# Patient Record
Sex: MALE | Race: BLACK OR AFRICAN AMERICAN | NOT HISPANIC OR LATINO | Employment: UNEMPLOYED | URBAN - METROPOLITAN AREA
[De-identification: names, ages, dates, MRNs, and addresses within clinical notes are randomized per-mention and may not be internally consistent; named-entity substitution may affect disease eponyms.]

---

## 2017-05-14 ENCOUNTER — HOSPITAL ENCOUNTER (EMERGENCY)
Facility: HOSPITAL | Age: 52
Discharge: HOME | End: 2017-05-14
Attending: EMERGENCY MEDICINE

## 2017-05-14 VITALS
OXYGEN SATURATION: 98 % | RESPIRATION RATE: 15 BRPM | BODY MASS INDEX: 33.13 KG/M2 | TEMPERATURE: 99 F | HEART RATE: 95 BPM | HEIGHT: 73 IN | SYSTOLIC BLOOD PRESSURE: 149 MMHG | DIASTOLIC BLOOD PRESSURE: 85 MMHG | WEIGHT: 250 LBS

## 2017-05-14 DIAGNOSIS — J06.9 URI (UPPER RESPIRATORY INFECTION): ICD-10-CM

## 2017-05-14 PROCEDURE — 99283 EMERGENCY DEPT VISIT LOW MDM: CPT

## 2017-05-14 NOTE — DISCHARGE INSTRUCTIONS
Viral Upper Respiratory Illness (Adult)  You have a viral upper respiratory illness (URI), which is another term for the common cold. This illness is contagious during the first few days. It is spread through the air by coughing and sneezing. It may also be spread by direct contact (touching the sick person and then touching your own eyes, nose, or mouth). Frequent handwashing will decrease risk of spread. Most viral illnesses go away within 7 to 10 days with rest and simple home remedies. Sometimes the illness may last for several weeks. Antibiotics will not kill a virus, and they are generally not prescribed for this condition.    Home care  · If symptoms are severe, rest at home for the first 2 to 3 days. When you resume activity, don't let yourself get too tired.  · Avoid being exposed to cigarette smoke (yours or others).  · You may use acetaminophen or ibuprofen to control pain and fever, unless another medicine was prescribed. (Note: If you have chronic liver or kidney disease, have ever had a stomach ulcer or gastrointestinal bleeding, or are taking blood-thinning medicines, talk with your healthcare provider before using these medicines.) Aspirin should never be given to anyone under 18 years of age who is ill with a viral infection or fever. It may cause severe liver or brain damage.  · Your appetite may be poor, so a light diet is fine. Avoid dehydration by drinking 6 to 8 glasses of fluids per day (water, soft drinks, juices, tea, or soup). Extra fluids will help loosen secretions in the nose and lungs.  · Over-the-counter cold medicines will not shorten the length of time youre sick, but they may be helpful for the following symptoms: cough, sore throat, and nasal and sinus congestion. (Note: Do not use decongestants if you have high blood pressure.)  Follow-up care  Follow up with your healthcare provider, or as advised.  When to seek medical advice  Call your healthcare provider right away if any  of these occur:  · Cough with lots of colored sputum (mucus)  · Severe headache; face, neck, or ear pain  · Difficulty swallowing due to throat pain  · Fever of 100.4°F (38°C)  Call 911, or get immediate medical care  Call emergency services right away if any of these occur:  · Chest pain, shortness of breath, wheezing, or difficulty breathing  · Coughing up blood  · Inability to swallow due to throat pain  Date Last Reviewed: 9/13/2015  © 8235-5238 [employer]. [address] [place] [address]. All rights reserved. This information is not intended as a substitute for professional medical care. Always follow your healthcare professional's instructions.

## 2017-05-14 NOTE — ED PROVIDER NOTES
SCRIBE #1 NOTE: I, Corinne Mack, am scribing for, and in the presence of, Ramiro Vega MD. I have scribed the entire note.      History      Chief Complaint   Patient presents with    Sore Throat     X 3 days       Review of patient's allergies indicates:  No Known Allergies     HPI   HPI    5/14/2017, 6:40 AM   History obtained from the patient      History of Present Illness: Demetrius Nogueira is a 51 y.o. male patient who presents to the Emergency Department for  A sore throat which onset gradually 3 days ago. Symptoms are constant and moderate in severity. Pt states that he has chest discomfort and sore throat when coughing. No mitigating or exacerbating factors reported. No associated sxs. Patient denies any fever, chills, CP, SOB, cough, N/V/D, abd pain, back pain, HA, dizziness, rhinorrhea, congestion, and all other sxs at this time. Pt has hx of oral yeast infection 1 month ago. No further complaints or concerns at this time.         Arrival mode: Personal vehicle      PCP: Primary Doctor No       Past Medical History:  No past medical history on file.    Past Surgical History:  Past Surgical History:   Procedure Laterality Date    CYST REMOVAL      cyst removal from right thumb         Family History:  Family History   Problem Relation Age of Onset    Cancer Paternal Grandmother      breast cancer    Cancer Maternal Grandfather        Social History:  Social History     Social History Main Topics    Smoking status: Never Smoker    Smokeless tobacco: Never Used    Alcohol use No    Drug use: No    Sexual activity: Not given       ROS   Review of Systems   Constitutional: Negative for chills, fatigue and fever.   HENT: Positive for sore throat (with cough). Negative for congestion, drooling, facial swelling, rhinorrhea and trouble swallowing.    Respiratory: Positive for cough. Negative for shortness of breath.    Cardiovascular: Positive for chest pain (with cough). Negative for leg  swelling.   Gastrointestinal: Negative for abdominal pain, diarrhea, nausea and vomiting.   Genitourinary: Negative.    Musculoskeletal: Negative for back pain and neck pain.   Skin: Negative for rash and wound.   Neurological: Negative for dizziness, numbness and headaches.   All other systems reviewed and are negative.      Physical Exam    Initial Vitals   BP Pulse Resp Temp SpO2   05/14/17 0635 05/14/17 0635 05/14/17 0635 05/14/17 0635 05/14/17 0635   149/85 95 15 99 °F (37.2 °C) 98 %      Physical Exam  Nursing Notes and Vital Signs Reviewed.  Constitutional: Patient is in no apparent distress. Awake and alert. Well-developed and well-nourished.  Head: Atraumatic. Normocephalic.  Eyes: PERRL. EOM intact. Conjunctivae are not pale. No scleral icterus.  ENT: Mucous membranes are moist. Oropharynx is clear and symmetric. Tongue is normal size. No tonsillar erythema or exudates. Uvula is normal size with no edema. No drooling. No tenderness or swelling to parotid glands. Oral floor is soft without protrusion. No peritonsillar abscess. No airway obstruction. Pt is handling secretions.  Neck: Supple. Full ROM. No lymphadenopathy.  Cardiovascular: Regular rate. Regular rhythm. No murmurs, rubs, or gallops. Distal pulses are 2+ and symmetric.  Pulmonary/Chest: No respiratory distress. Clear to auscultation bilaterally. No wheezing, rales, or rhonchi.  Abdominal: Soft and non-distended.  There is no tenderness.  No rebound, guarding, or rigidity.   Musculoskeletal: Moves all extremities. No obvious deformities. No edema. No calf tenderness.  Skin: Warm and dry.  Neurological:  Alert, awake, and appropriate.  Normal speech.  No acute focal neurological deficits are appreciated.  Psychiatric: Normal affect. Good eye contact. Appropriate in content.    ED Course    Procedures  ED Vital Signs:  Vitals:    05/14/17 0635   BP: (!) 149/85   Pulse: 95   Resp: 15   Temp: 99 °F (37.2 °C)   TempSrc: Oral   SpO2: 98%   Weight:  "113.4 kg (250 lb)   Height: 6' 1" (1.854 m)       Abnormal Lab Results:  Labs Reviewed - No data to display     All Lab Results:  None    Imaging Results:  Imaging Results         X-Ray Chest PA And Lateral (Final result) Result time:  05/14/17 08:05:47    Final result by Osvaldo Norris MD (05/14/17 08:05:47)    Impression:         No acute findings.      Electronically signed by: OSVALDO NORRIS MD  Date:     05/14/17  Time:    08:05     Narrative:    Exam: XR CHEST PA AND LATERAL,    Date:  05/14/17 07:53:16    History: Acute upper respiratory infection, and specified    Comparison:  No prior  studies available for comparison.    Findings:     Lungs clear.  Heart is within normal limits.  Mildly tortuous aorta.  No significant bony abnormality.                      The Emergency Provider reviewed the vital signs and test results, which are outlined above.    ED Discussion     8:13 AM: Reassessed pt at this time. AA and in no distress. Pt is handling secretions. Follow-up with PCP. Discussed with pt all pertinent ED information and results. Discussed pt dx and plan of tx. Gave pt all f/u and return to the ED instructions. All questions and concerns were addressed at this time. Pt expresses understanding of information and instructions, and is comfortable with plan to discharge. Pt is stable for discharge.    Patient presents with upper respiratory and flulike symptoms. Based on my assessment in the ED, I do not suspect any respiratory, airway, pulmonary, cardiovascular (including myocarditis), metabolic, CNS, medical, or surgical emergency medical condition. I have discussed with the patient and/or caregiver signs and symptoms for secondary bacterial infections, such as pneumonia. I believe that the patient's symptoms are most consistent with a viral illness, possibly influenza. Patient is safe for discharge home with conservative therapy.      ED Medication(s):  Medications - No data to display    New " Prescriptions    No medications on file       Follow-up Information     Follow up with Primary Doctor No.        Follow up with [facility] In 1 day.    Specialty:  Emergency Medicine    Why:  Patient should follow up with PCP in 1-2 days or return to the ED for any concerns or worsening of condition.    Contact information:    [address] [facility] [address] [phone]            Medical Decision Making    Medical Decision Making:   Clinical Tests:   Radiological Study: Ordered and Reviewed           Scribe Attestation:   Scribe #1: I performed the above scribed service and the documentation accurately describes the services I performed. I attest to the accuracy of the note.    Attending:   Physician Attestation Statement for Scribe #1: I, Ramiro Vega MD, personally performed the services described in this documentation, as scribed by Corinne Mack, in my presence, and it is both accurate and complete.          Clinical Impression       ICD-10-CM ICD-9-CM   1. URI (upper respiratory infection) J06.9 465.9       Disposition:   Disposition: Discharged  Condition: Stable         Ramiro Vega MD  05/14/17 0820

## 2017-05-14 NOTE — ED AVS SNAPSHOT
[facility]  [id number] [place] [id number]               Demetrius Nogueira   2017  7:18 AM   ED    Description:  Male : 1965   Department:  [facility]           Your Care was Coordinated By:     Provider Role From To    Ramiro Vega MD Attending Provider 17 0718 17 0818      Reason for Visit     Sore Throat           Diagnoses this Visit        Comments    URI (upper respiratory infection)           ED Disposition     ED Disposition Condition Comment    Discharge             To Do List           Follow-up Information     Follow up with Primary Doctor No.        Follow up with [facility] In 1 day.    Specialty:  Emergency Medicine    Why:  Patient should follow up with PCP in 1-2 days or return to the ED for any concerns or worsening of condition.    Contact information:    [address] [place] [id number] [phone]      [facility]     [facility] Care Line -  Assistance  Unless otherwise directed by your provider, please contact [facility], our nurse care line that is available for  assistance.     Registered nurses in the [facility] provide: appointment scheduling, clinical advisement, health education, and other advisory services.  Call: [phone] (toll free)               Medications           Message regarding Medications     Verify the changes and/or additions to your medication regime listed below are the same as discussed with your clinician today.  If any of these changes or additions are incorrect, please notify your healthcare provider.             Verify that the below list of medications is an accurate representation of the medications you are currently taking.  If none reported, the list may be blank. If incorrect, please contact your healthcare provider. Carry this list with you in case of emergency.          "       Clinical Reference Information           Your Vitals Were     BP Pulse Temp Resp Height Weight    149/85 (BP Location: Right arm, Patient Position: Sitting) 95 99 °F (37.2 °C) (Oral) 15 6' 1" (1.854 m) 113.4 kg (250 lb)    SpO2 BMI             98% 32.98 kg/m2         Allergies as of 5/14/2017     No Known Allergies      Immunizations Administered on Date of Encounter - 5/14/2017     None      ED Micro, Lab, POCT     None      ED Imaging Orders     Start Ordered       Status Ordering Provider    05/14/17 0736 05/14/17 0735  X-Ray Chest PA And Lateral  1 time imaging      Final result         Discharge Instructions         Viral Upper Respiratory Illness (Adult)  You have a viral upper respiratory illness (URI), which is another term for the common cold. This illness is contagious during the first few days. It is spread through the air by coughing and sneezing. It may also be spread by direct contact (touching the sick person and then touching your own eyes, nose, or mouth). Frequent handwashing will decrease risk of spread. Most viral illnesses go away within 7 to 10 days with rest and simple home remedies. Sometimes the illness may last for several weeks. Antibiotics will not kill a virus, and they are generally not prescribed for this condition.    Home care  · If symptoms are severe, rest at home for the first 2 to 3 days. When you resume activity, don't let yourself get too tired.  · Avoid being exposed to cigarette smoke (yours or others).  · You may use acetaminophen or ibuprofen to control pain and fever, unless another medicine was prescribed. (Note: If you have chronic liver or kidney disease, have ever had a stomach ulcer or gastrointestinal bleeding, or are taking blood-thinning medicines, talk with your healthcare provider before using these medicines.) Aspirin should never be given to anyone under 18 years of age who is ill with a viral infection or fever. It may cause severe liver or brain " damage.  · Your appetite may be poor, so a light diet is fine. Avoid dehydration by drinking 6 to 8 glasses of fluids per day (water, soft drinks, juices, tea, or soup). Extra fluids will help loosen secretions in the nose and lungs.  · Over-the-counter cold medicines will not shorten the length of time youre sick, but they may be helpful for the following symptoms: cough, sore throat, and nasal and sinus congestion. (Note: Do not use decongestants if you have high blood pressure.)  Follow-up care  Follow up with your healthcare provider, or as advised.  When to seek medical advice  Call your healthcare provider right away if any of these occur:  · Cough with lots of colored sputum (mucus)  · Severe headache; face, neck, or ear pain  · Difficulty swallowing due to throat pain  · Fever of 100.4°F (38°C)  Call 911, or get immediate medical care  Call emergency services right away if any of these occur:  · Chest pain, shortness of breath, wheezing, or difficulty breathing  · Coughing up blood  · Inability to swallow due to throat pain  Date Last Reviewed: 9/13/2015  © 7348-9337 [employer]. [address] [place] [address]. All rights reserved. This information is not intended as a substitute for professional medical care. Always follow your healthcare professional's instructions.           [facility] complies with applicable Federal civil rights laws and does not discriminate on the basis of race, color, national origin, age, disability, or sex.        Language Assistance Services     ATTENTION: Language assistance services are available, free of charge. Please call [phone].      ATENCIÓN: Si habla español, tiene a cool disposición servicios gratuitos de asistencia lingüística. Llame al [phone].     CHÚ Ý: N?u b?n nói Ti?ng Vi?t, có các d?ch v? h? tr? ngôn ng? mi?n phí dành cho b?n. G?i s? [phone].

## 2018-06-08 ENCOUNTER — OFFICE VISIT (OUTPATIENT)
Dept: FAMILY MEDICINE | Facility: CLINIC | Age: 53
End: 2018-06-08
Payer: COMMERCIAL

## 2018-06-08 VITALS
BODY MASS INDEX: 31.74 KG/M2 | SYSTOLIC BLOOD PRESSURE: 144 MMHG | WEIGHT: 234.38 LBS | OXYGEN SATURATION: 98 % | HEIGHT: 72 IN | HEART RATE: 60 BPM | TEMPERATURE: 98 F | DIASTOLIC BLOOD PRESSURE: 86 MMHG

## 2018-06-08 DIAGNOSIS — G70.00 MYASTHENIA GRAVIS: ICD-10-CM

## 2018-06-08 DIAGNOSIS — R03.0 ELEVATED BP WITHOUT DIAGNOSIS OF HYPERTENSION: ICD-10-CM

## 2018-06-08 DIAGNOSIS — T50.905A MEDICATION SIDE EFFECT, INITIAL ENCOUNTER: Primary | ICD-10-CM

## 2018-06-08 PROCEDURE — 99999 PR PBB SHADOW E&M-EST. PATIENT-LVL III: CPT | Mod: PBBFAC,,, | Performed by: FAMILY MEDICINE

## 2018-06-08 PROCEDURE — 99204 OFFICE O/P NEW MOD 45 MIN: CPT | Mod: S$GLB,,, | Performed by: FAMILY MEDICINE

## 2018-06-08 PROCEDURE — 3008F BODY MASS INDEX DOCD: CPT | Mod: CPTII,S$GLB,, | Performed by: FAMILY MEDICINE

## 2018-06-08 RX ORDER — PYRIDOSTIGMINE BROMIDE 60 MG/1
30 TABLET ORAL
COMMUNITY
Start: 2018-04-06 | End: 2019-04-06

## 2018-06-08 NOTE — PROGRESS NOTES
Subjective:       Patient ID: Demetrius Nogueira is a 52 y.o. male.    Chief Complaint: Sticky tingue  Sticky tongue: x 3 months, intermittent. He feels like his tongue is sticky and slimy such that things stick to his tongue. He could be brushing and the toothpaste would not wash out and stick to his tongue. He takes sips of water.   He has Myasthenia Gravis which is Stable on Mestinon. Before the slimy tongue, he had thrush and it was cleared with nystatin. He sees Dr Mays  Cig: No  Alcohol; NO  Sexually active-yes  Has EGD scheduled in 4 days to rule out etiology for dysphagia. Denies pain, tenderness and any other sxs        Past Medical History:   Diagnosis Date    MG (myasthenia gravis) 03/01/2018    MG (myasthenia gravis) 03/01/2018     Family History   Problem Relation Age of Onset    Cancer Paternal Grandmother         breast cancer    Cancer Maternal Grandfather      Social History     Social History    Marital status:      Spouse name: N/A    Number of children: N/A    Years of education: N/A     Occupational History    Not on file.     Social History Main Topics    Smoking status: Never Smoker    Smokeless tobacco: Never Used    Alcohol use No    Drug use: No    Sexual activity: Yes     Partners: Female     Birth control/ protection: None     Other Topics Concern    Not on file     Social History Narrative    No narrative on file       Review of Systems   Constitutional: Negative for appetite change and fever.   HENT: Negative for congestion, facial swelling and voice change.    Eyes: Negative for discharge and itching.   Respiratory: Negative for cough, chest tightness and wheezing.    Cardiovascular: Negative.  Negative for chest pain and leg swelling.   Gastrointestinal: Negative for abdominal pain, nausea and vomiting.   Endocrine: Negative for cold intolerance and heat intolerance.   Genitourinary: Negative for dysuria and flank pain.   Musculoskeletal: Negative for myalgias  and neck stiffness.   Skin: Negative for pallor and rash.   Neurological: Negative for facial asymmetry and weakness.   Psychiatric/Behavioral: Negative for agitation and confusion.       Objective:      BP (!) 144/86 (BP Location: Right arm, Patient Position: Sitting, BP Method: Large (Manual))   Pulse 60   Temp 98.1 °F (36.7 °C) (Oral)   Ht 6' (1.829 m)   Wt 106.3 kg (234 lb 5.6 oz)   SpO2 98%   BMI 31.78 kg/m²   Results for orders placed or performed in visit on 12/05/13   PSA, total and free   Result Value Ref Range    PSA Total 0.54 0.00 - 4.00 ng/mL    PSA, Free 0.24 0.01 - 1.50 ng/mL    PSA, Free Pct 44.44 Not established %   TESTOSTERONE PANEL   Result Value Ref Range    Testosterone 429 250 - 1100 ng/dL    Testosterone, Free 54.0 46.0 - 224.0 pg/mL    Testosterone, Bioavailable 111.1 110.0 - 575.0 ng/dL    Sex Hormone Binding Globulin 35 10 - 50 nmol/L    Albumin 4.5 3.6 - 5.1 g/dL     Physical Exam   Constitutional: He is oriented to person, place, and time. He appears well-developed and well-nourished.   HENT:   Head: Normocephalic and atraumatic.   Mouth/Throat: Oropharynx is clear and moist and mucous membranes are normal. No oral lesions. No dental abscesses or dental caries.   Eyes: Conjunctivae are normal. Right eye exhibits no discharge. Left eye exhibits no discharge.   Neck: Neck supple.   Cardiovascular: Normal rate and regular rhythm.    Pulmonary/Chest: Effort normal. He exhibits no tenderness.   Abdominal: Normal appearance. There is no hepatosplenomegaly.   Lymphadenopathy:     He has no cervical adenopathy.   Neurological: He is alert and oriented to person, place, and time.   Skin: Skin is warm and dry.   Psychiatric: He has a normal mood and affect. His behavior is normal.   Nursing note and vitals reviewed.      Assessment:       1. Medication side effect, initial encounter    2. Elevated BP without diagnosis of hypertension    3. Myasthenia gravis        Plan:   Medication side  effect, initial encounter/Myasthenia gravis  Sticky tongue-very thick saliva and inability to swallow all are side effects of mestinon.  Patient to discuss side effect with neurologist and complete EGD to rule out other etiology.      Elevated BP without diagnosis of hypertension  Monitor at home and record  A total of 20 minutes was spent in face to face time, of which over 50 % was spent in counseling patient on disease process, complications, treatment, and side effects of medications.

## 2018-06-10 PROBLEM — G70.00 MYASTHENIA GRAVIS: Status: ACTIVE | Noted: 2018-06-10

## 2018-06-10 NOTE — PATIENT INSTRUCTIONS
Discharge Instructions for Myasthenia Gravis  You have been diagnosed with myasthenia gravis, a disease that affects the transmission of nerve impulses to the muscles. This causes the muscles to become weak. The muscle weakness usually gets worse during periods of activity and improves after periods of rest. Heres what you can do to help yourself feel better.  Activity  Try these tips to help you deal with your daily activities:  · Remember, its normal to have times when you feel energetic and times when you feel exhausted. Daily changes in your energy level are common.  · Plan your daily activities around the times when you feel more energetic. These periods are usually in the morning or after a nap. You may have more weakness at the end of the day.  · Rest often throughout the day.  · Avoid strenuous exercise. Short walks spread out through the day will keep you fit without exhausting you.  · Do one thing at a time.  · Allow yourself plenty of time to get ready for appointments so youre not rushed.  · To save energy while getting dressed, lay out your clothes and accessories in one area where its easy for you to reach everything. Try to avoid making extra trips back and forth to your closet or dresser drawers.  · Install grab bars in your shower or tub to make it easier to get in and out. A shower chair may also be helpful.  · Strengthen your voice by reading aloud. Singing is also a good exercise.  · Add a voice amplifier to your phone so that others can hear you better.  Medical care and support  · Take your medicine exactly as directed.  · Use prescribed eye drops for dry eyes. Dry eyes and other eye problems are common with myasthenia gravis.  · Make regular follow-up appointments with your healthcare provider.  · Wear a medical identification bracelet that shows you have myasthenia gravis.  · Join a support group. Ask your healthcare provider about groups in your area.  · Do not start new medicines  without checking with your healthcare provider.  Other precautions  · Protect yourself from infection:  ¨ Wash your hands often; keep them away from your face. Most germs are spread by hand-to-mouth contact.  ¨ Get a flu shot every year. Ask your healthcare provider about pneumonia vaccines.  ¨ Stay out of crowds, especially in the winter; thats when more people have colds and the flu.  · Avoid drinking alcohol. Alcohol can increase weakness.  · Make an appointment with a nutritionist (or dietitian). During extended times of weakness, you may need to change your diet to avoid choking. A nutritionist can help you plan for these times. Here are some tips that should help:  ¨ Eat soft foods, such as mashed potatoes or applesauce, to make swallowing easier.  ¨ Eat warm (not hot) foods.  ¨ Eat slowly. Cut your food into small pieces and chew it thoroughly before swallowing.  Follow-up care  Make a follow-up appointment.  When to call your healthcare provider  Call your healthcare provider right away if you have any of the following:  · Trouble swallowing, chewing, speaking, or breathing  · Weakness in your face  · Excessive sweating  · Drooling  · Dizziness or confusion  · Extreme muscle weakness  · Double vision or blurred vision  · Stomach pain or diarrhea   Date Last Reviewed: 11/8/2015 © 2000-2017 [employer], [employer]. [address] [place] [address], [place] [address]. All rights reserved. This information is not intended as a substitute for professional medical care. Always follow your healthcare professional's instructions.

## 2019-10-17 ENCOUNTER — TELEPHONE (OUTPATIENT)
Dept: ADMINISTRATIVE | Facility: HOSPITAL | Age: 54
End: 2019-10-17

## 2019-10-17 NOTE — TELEPHONE ENCOUNTER
Attempted to contact patient to schedule annual visit with PCP Dr Isabelle Manuel, unable to leave voicemail to telelphone having busy signal. PDaugherty

## 2021-05-10 ENCOUNTER — PATIENT MESSAGE (OUTPATIENT)
Dept: RESEARCH | Facility: HOSPITAL | Age: 56
End: 2021-05-10

## 2021-07-01 ENCOUNTER — PATIENT MESSAGE (OUTPATIENT)
Dept: ADMINISTRATIVE | Facility: OTHER | Age: 56
End: 2021-07-01

## 2022-11-11 ENCOUNTER — PATIENT MESSAGE (OUTPATIENT)
Dept: RESEARCH | Facility: HOSPITAL | Age: 57
End: 2022-11-11
Payer: COMMERCIAL